# Patient Record
Sex: FEMALE | Race: BLACK OR AFRICAN AMERICAN | Employment: OTHER | URBAN - METROPOLITAN AREA
[De-identification: names, ages, dates, MRNs, and addresses within clinical notes are randomized per-mention and may not be internally consistent; named-entity substitution may affect disease eponyms.]

---

## 2018-10-25 ENCOUNTER — APPOINTMENT (OUTPATIENT)
Dept: CT IMAGING | Facility: HOSPITAL | Age: 73
DRG: 244 | End: 2018-10-25
Attending: EMERGENCY MEDICINE
Payer: MEDICARE

## 2018-10-25 ENCOUNTER — HOSPITAL ENCOUNTER (INPATIENT)
Facility: HOSPITAL | Age: 73
LOS: 5 days | Discharge: HOME | DRG: 244 | End: 2018-10-31
Attending: EMERGENCY MEDICINE | Admitting: HOSPITALIST
Payer: MEDICARE

## 2018-10-25 ENCOUNTER — APPOINTMENT (OUTPATIENT)
Dept: GENERAL RADIOLOGY | Facility: HOSPITAL | Age: 73
DRG: 244 | End: 2018-10-25
Attending: EMERGENCY MEDICINE
Payer: MEDICARE

## 2018-10-25 DIAGNOSIS — R55 SYNCOPE AND COLLAPSE: Primary | ICD-10-CM

## 2018-10-25 DIAGNOSIS — R77.8 ELEVATED TROPONIN: ICD-10-CM

## 2018-10-25 PROCEDURE — 71046 X-RAY EXAM CHEST 2 VIEWS: CPT | Performed by: EMERGENCY MEDICINE

## 2018-10-25 PROCEDURE — 99223 1ST HOSP IP/OBS HIGH 75: CPT | Performed by: HOSPITALIST

## 2018-10-25 PROCEDURE — 71260 CT THORAX DX C+: CPT | Performed by: EMERGENCY MEDICINE

## 2018-10-25 RX ORDER — ASPIRIN 81 MG/1
324 TABLET, CHEWABLE ORAL ONCE
Status: COMPLETED | OUTPATIENT
Start: 2018-10-25 | End: 2018-10-25

## 2018-10-25 RX ORDER — SODIUM CHLORIDE 9 MG/ML
INJECTION, SOLUTION INTRAVENOUS ONCE
Status: COMPLETED | OUTPATIENT
Start: 2018-10-25 | End: 2018-10-25

## 2018-10-25 RX ORDER — SODIUM CHLORIDE 9 MG/ML
1000 INJECTION, SOLUTION INTRAVENOUS ONCE
Status: COMPLETED | OUTPATIENT
Start: 2018-10-25 | End: 2018-10-25

## 2018-10-26 ENCOUNTER — APPOINTMENT (OUTPATIENT)
Dept: CV DIAGNOSTICS | Facility: HOSPITAL | Age: 73
DRG: 244 | End: 2018-10-26
Attending: HOSPITALIST
Payer: MEDICARE

## 2018-10-26 PROBLEM — R77.8 ELEVATED TROPONIN: Status: ACTIVE | Noted: 2018-10-26

## 2018-10-26 PROCEDURE — 93306 TTE W/DOPPLER COMPLETE: CPT | Performed by: HOSPITALIST

## 2018-10-26 PROCEDURE — 99233 SBSQ HOSP IP/OBS HIGH 50: CPT | Performed by: HOSPITALIST

## 2018-10-26 RX ORDER — ATORVASTATIN CALCIUM 20 MG/1
20 TABLET, FILM COATED ORAL NIGHTLY
Status: DISCONTINUED | OUTPATIENT
Start: 2018-10-26 | End: 2018-10-26

## 2018-10-26 RX ORDER — HEPARIN SODIUM 5000 [USP'U]/ML
5000 INJECTION, SOLUTION INTRAVENOUS; SUBCUTANEOUS EVERY 12 HOURS
Status: DISCONTINUED | OUTPATIENT
Start: 2018-10-26 | End: 2018-10-30

## 2018-10-26 RX ORDER — ASPIRIN 81 MG/1
81 TABLET, CHEWABLE ORAL DAILY
Status: DISCONTINUED | OUTPATIENT
Start: 2018-10-26 | End: 2018-10-31

## 2018-10-26 RX ORDER — DEXTROSE MONOHYDRATE 25 G/50ML
50 INJECTION, SOLUTION INTRAVENOUS AS NEEDED
Status: DISCONTINUED | OUTPATIENT
Start: 2018-10-26 | End: 2018-10-31

## 2018-10-26 RX ORDER — ACETAMINOPHEN 325 MG/1
650 TABLET ORAL EVERY 6 HOURS PRN
Status: DISCONTINUED | OUTPATIENT
Start: 2018-10-26 | End: 2018-10-31

## 2018-10-26 RX ORDER — AMLODIPINE BESYLATE 5 MG/1
5 TABLET ORAL DAILY
Status: DISCONTINUED | OUTPATIENT
Start: 2018-10-26 | End: 2018-10-26

## 2018-10-26 RX ORDER — INSULIN ASPART 100 [IU]/ML
12 INJECTION, SOLUTION INTRAVENOUS; SUBCUTANEOUS
Status: DISCONTINUED | OUTPATIENT
Start: 2018-10-26 | End: 2018-10-31

## 2018-10-26 RX ORDER — INSULIN ASPART 100 [IU]/ML
12 INJECTION, SOLUTION INTRAVENOUS; SUBCUTANEOUS
Status: ON HOLD | COMMUNITY
End: 2018-10-31

## 2018-10-26 RX ORDER — ATORVASTATIN CALCIUM 40 MG/1
80 TABLET, FILM COATED ORAL NIGHTLY
Status: DISCONTINUED | OUTPATIENT
Start: 2018-10-26 | End: 2018-10-31

## 2018-10-26 RX ORDER — ONDANSETRON 2 MG/ML
4 INJECTION INTRAMUSCULAR; INTRAVENOUS EVERY 6 HOURS PRN
Status: DISCONTINUED | OUTPATIENT
Start: 2018-10-26 | End: 2018-10-30

## 2018-10-26 RX ORDER — SODIUM CHLORIDE AND POTASSIUM CHLORIDE .9; .15 G/100ML; G/100ML
SOLUTION INTRAVENOUS CONTINUOUS
Status: DISCONTINUED | OUTPATIENT
Start: 2018-10-26 | End: 2018-10-26

## 2018-10-26 RX ORDER — AMLODIPINE BESYLATE 10 MG/1
10 TABLET ORAL DAILY
Status: DISCONTINUED | OUTPATIENT
Start: 2018-10-27 | End: 2018-10-31

## 2018-10-26 RX ORDER — IRBESARTAN AND HYDROCHLOROTHIAZIDE 300; 12.5 MG/1; MG/1
1 TABLET, FILM COATED ORAL DAILY
Status: DISCONTINUED | OUTPATIENT
Start: 2018-10-26 | End: 2018-10-26 | Stop reason: RX

## 2018-10-26 RX ORDER — 0.9 % SODIUM CHLORIDE 0.9 %
VIAL (ML) INJECTION
Status: COMPLETED
Start: 2018-10-26 | End: 2018-10-26

## 2018-10-26 RX ORDER — ALPRAZOLAM 0.5 MG/1
0.5 TABLET ORAL 3 TIMES DAILY PRN
COMMUNITY

## 2018-10-26 RX ORDER — FELODIPINE 5 MG/1
5 TABLET, EXTENDED RELEASE ORAL DAILY
COMMUNITY

## 2018-10-26 RX ORDER — HYDROCODONE BITARTRATE AND ACETAMINOPHEN 5; 325 MG/1; MG/1
1 TABLET ORAL EVERY 8 HOURS PRN
COMMUNITY

## 2018-10-26 RX ORDER — POTASSIUM CHLORIDE 20 MEQ/1
40 TABLET, EXTENDED RELEASE ORAL EVERY 4 HOURS
Status: COMPLETED | OUTPATIENT
Start: 2018-10-26 | End: 2018-10-26

## 2018-10-26 RX ORDER — IRBESARTAN AND HYDROCHLOROTHIAZIDE 300; 12.5 MG/1; MG/1
1 TABLET, FILM COATED ORAL DAILY
COMMUNITY

## 2018-10-26 RX ORDER — SODIUM CHLORIDE 9 MG/ML
INJECTION, SOLUTION INTRAVENOUS CONTINUOUS
Status: DISCONTINUED | OUTPATIENT
Start: 2018-10-26 | End: 2018-10-26

## 2018-10-26 RX ORDER — SIMVASTATIN 40 MG
40 TABLET ORAL NIGHTLY
COMMUNITY

## 2018-10-26 RX ORDER — MAGNESIUM OXIDE 400 MG (241.3 MG MAGNESIUM) TABLET
400 TABLET ONCE
Status: COMPLETED | OUTPATIENT
Start: 2018-10-26 | End: 2018-10-26

## 2018-10-26 NOTE — CONSULTS
[facility]    Report of Consultation    Carlos Mondragon Patient Status:  Inpatient    1945 MRN [id number]   Location [facility] 3W/SW Attending Kasey Zee, [address] [facility] Day # 0 PCP Unknown Pcp     Date of Admission:  10/25 losartan (COZAAR) 100 mg, hydrochlorothiazide (MICROZIDE) 12.5 mg for Hyzaar 100/12.5 (H only)  Oral Daily       Medications Prior to Admission:  ALPRAZolam 0.5 MG Oral Tab Take 0.5 mg by mouth 3 (three) times daily as needed for Sleep or Anxiety.    in murmur, click, rub or gallop, regular rate and rhythm  Abdominal: soft, non-tender; bowel sounds normal; no masses,  no organomegaly  Extremities: extremities normal, atraumatic, no cyanosis or edema  Pulses: 2+ and symmetric    Results:     Laboratory Margarito intermittent mobitz 2 AV block:  - echo today  - DSE in AM or [facility] Monday , I ll discuss with Dr Basil Lo  - TSH and free T4  - avoid BB and verapamil or diltiazem  - might need PPM     2 Elevated troponin she had CPR for few seconds, flat curve,   - Echo

## 2018-10-26 NOTE — H&P
Fritz 30 Patient Status:  Emergency    1945 MRN [id number]   Location [address] [facility] Attending Jemma Davis MD   Hosp Day # 0 PCP Unknown Pcp     Date:  10/25 Negative. Integumentary:  Negative. Neurologic:  Negative. Psychiatric:  Negative.   ROS reviewed as documented in chart    Physical Exam:  Temp:  [99 °F (37.2 °C)] 99 °F (37.2 °C)  Pulse:  [110-114] 112  Resp:  [18-26] 22  BP: (133-156)/(66-75) 144/69 get 2D echo to further evaluate cardiac status, cardiology has been consulted. Elevated troponin  Unclear significance post \"CPR\", cardiology on board, consider stress test to further evaluate.     Sinus tachycardia  Check TSH, possibly early withdrawa

## 2018-10-26 NOTE — ED INITIAL ASSESSMENT (HPI)
dtr states that the patient is getting over bronchitis. Today patienbt had a coughing episode and had a syncopal episode during the cough. Family lowered patient to the ground. Family report patient was unresponsive for 1 min.  Family member began chest com

## 2018-10-26 NOTE — PLAN OF CARE
Problem: Patient/Family Goals  Goal: Patient/Family Long Term Goal  Patient's Long Term Goal:   - ability to exert without shortness of breath.     - stable diabetic parameters consistently    Interventions:  - watch specific dietary intake   - participate imbalances  - Administer electrolyte replacement as ordered  - Monitor response to electrolyte replacements, including rhythm and repeat lab results as appropriate  - Fluid restriction as ordered  - Instruct patient on fluid and nutrition restrictions as a Spiritual Care or Behavioral Health consult as needed  Outcome: Progressing      Problem: DECISION MAKING  Goal: Pt/Family able to effectively weigh alternatives and participate in decision making related to treatment and care  INTERVENTIONS:  - Determine

## 2018-10-26 NOTE — ED PROVIDER NOTES
Patient Seen in: [facility] AND [facility] Emergency Department    History   Patient presents with:  Syncope (cardiovascular, neurologic)    Stated Complaint: cough syncope    HPI    68-year-old female with history of diabetes, hypertension presents for evaluati SpO2 96%   BMI 30.73 kg/m²         Physical Exam   Constitutional: She is oriented to person, place, and time. She appears well-developed and well-nourished. No distress. HENT:   Head: Normocephalic and atraumatic.    Mouth/Throat: Oropharynx is clear and following components:    Troponin 0.04 (*)     All other components within normal limits   ETHYL ALCOHOL - Abnormal; Notable for the following components:    Ethyl Alcohol 157 (*)     All other components within normal limits   CBC W/ DIFFERENTIAL - Abnorm upper abdomen are unremarkable. PA and lateral chest x-ray at 2333    IMPRESSION:  Lungs are clear. Prominent cardiomediastinal silhouette.       Radiology exams  Viewed and reviewed by myself and findings discussed with patient including need for follo

## 2018-10-26 NOTE — PROGRESS NOTES
irbesartan-HCTZ (AVALIDE) 300-12.5 mg tab  is Non-Formulary Medication &  Auto-Substituted to losartan (COZAAR) 100 mg, hydrochlorothiazide (MICROZIDE) 12.5 mg for Hyzaar 100/12.5 (EEH only) Per P&T PROTOCOL

## 2018-10-26 NOTE — PROGRESS NOTES
[facility]    Progress Note    John Leonard Patient Status:  Inpatient    1945 MRN [id number]   Location [facility] 3W/SW Attending Ronnie Dorsey, [address] Day # 0 PCP Unknown Pcp       Subjective:   John Leonard is Component Value Date    WBC 4.5 10/26/2018    HGB 11.2 (L) 10/26/2018    HCT 35.3 10/26/2018     10/26/2018    CREATSERUM 0.69 10/26/2018    BUN 16 10/26/2018     10/26/2018    K 4.4 10/26/2018    K 4.4 10/26/2018     10/26/2018    CO2 Electronically signed on 10/26/2018 at 10:38 by Aura Clarke MD      Results:     CBC:    Lab Results   Component Value Date    WBC 4.5 10/26/2018    WBC 6.9 10/25/2018     Lab Results   Component Value Date    HGB 11.2 (L) 10/26/2018    HGB 12.1 10/25/2

## 2018-10-26 NOTE — PHYSICAL THERAPY NOTE
PHYSICAL THERAPY QUICK EVALUATION - INPATIENT    Room Number: 318/318-A  Evaluation Date: 10/26/2018  Presenting Problem: Syncope and collapse  Physician Order: PT Eval and Treat    Problem List  Principal Problem:    Syncope and collapse  Active Problem lying on back to sitting on the side of the bed?: None   How much help from another person does the patient currently need. ..   -   Moving to and from a bed to a chair (including a wheelchair)?: None   -   Need to walk in hospital room?: None   -   [place] Patient discharged from Physical Therapy services. Please re-order if a new functional limitation presents during this admission. GOALS  Patient was able to achieve the following goals . ..     Patient was able to transfer Safely and independently   Lorri Sandhoff

## 2018-10-26 NOTE — ED NOTES
Patient now is complaining of lightheadedness, shortness of breath and weakness. The patient denies chest pain or palpitations.

## 2018-10-27 PROCEDURE — 99232 SBSQ HOSP IP/OBS MODERATE 35: CPT | Performed by: HOSPITALIST

## 2018-10-27 RX ORDER — SODIUM CHLORIDE 9 MG/ML
INJECTION, SOLUTION INTRAVENOUS
Status: DISPENSED
Start: 2018-10-27 | End: 2018-10-27

## 2018-10-27 RX ORDER — MAGNESIUM OXIDE 400 MG (241.3 MG MAGNESIUM) TABLET
400 TABLET ONCE
Status: COMPLETED | OUTPATIENT
Start: 2018-10-27 | End: 2018-10-27

## 2018-10-27 RX ORDER — POTASSIUM CHLORIDE 20 MEQ/1
40 TABLET, EXTENDED RELEASE ORAL ONCE
Status: COMPLETED | OUTPATIENT
Start: 2018-10-27 | End: 2018-10-27

## 2018-10-27 NOTE — PROGRESS NOTES
[facility]    Progress Note    Enrique Saldana Patient Status:  Inpatient    1945 MRN [id number]   Location [facility] 3W/SW Attending Denzel Joiner, [address] [facility] [address] Day # 1 PCP Unknown Pcp       Subjective:   Enrique Saldana is Results   Component Value Date    WBC 4.6 10/27/2018    HGB 11.1 (L) 10/27/2018    HCT 35.1 10/27/2018     10/27/2018    CREATSERUM 0.62 10/27/2018    BUN 11 10/27/2018     10/27/2018    K 3.8 10/27/2018     10/27/2018    CO2 23 10/27/20 signed on 10/26/2018 at 10:38 by Nuzhat Singh MD      Results:     CBC:    Lab Results   Component Value Date    WBC 4.6 10/27/2018    WBC 4.5 10/26/2018    WBC 6.9 10/25/2018     Lab Results   Component Value Date    HGB 11.1 (L) 10/27/2018    HGB 11.2 documentation in H+P. Based on patients current state of illness, I anticipate that, after discharge, patient will require TBD.

## 2018-10-27 NOTE — PLAN OF CARE
ANXIETY    • Will report anxiety at manageable levels Progressing        CARDIOVASCULAR - ADULT    • Maintains optimal cardiac output and hemodynamic stability Progressing        COPING    • Pt/Family able to verbalize concerns and demonstrate effective co

## 2018-10-27 NOTE — PLAN OF CARE
Problem: Patient/Family Goals  Goal: Patient/Family Long Term Goal  Patient's Long Term Goal:   - ability to exert without shortness of breath.     - stable diabetic parameters consistently    Interventions:  - watch specific dietary intake   - participate imbalances  - Administer electrolyte replacement as ordered  - Monitor response to electrolyte replacements, including rhythm and repeat lab results as appropriate  - Fluid restriction as ordered  - Instruct patient on fluid and nutrition restrictions as a Spiritual Care or Behavioral Health consult as needed  Outcome: Progressing      Problem: DECISION MAKING  Goal: Pt/Family able to effectively weigh alternatives and participate in decision making related to treatment and care  INTERVENTIONS:  - Determine test was canceled last night unbeknownst to nursing staff. Patient is self care; up to the bathroom by herself. Patient states that she is feeling weak and tired; call from tele with ; she displaying increased HR yesterday, as well.  Currently await

## 2018-10-28 PROCEDURE — 99233 SBSQ HOSP IP/OBS HIGH 50: CPT | Performed by: HOSPITALIST

## 2018-10-28 RX ORDER — CHLORHEXIDINE GLUCONATE 4 G/100ML
30 SOLUTION TOPICAL
Status: COMPLETED | OUTPATIENT
Start: 2018-10-29 | End: 2018-10-29

## 2018-10-28 RX ORDER — SODIUM CHLORIDE 9 MG/ML
INJECTION, SOLUTION INTRAVENOUS CONTINUOUS
Status: DISCONTINUED | OUTPATIENT
Start: 2018-10-28 | End: 2018-10-28

## 2018-10-28 RX ORDER — ASPIRIN 81 MG/1
324 TABLET, CHEWABLE ORAL ONCE
Status: COMPLETED | OUTPATIENT
Start: 2018-10-28 | End: 2018-10-29

## 2018-10-28 RX ORDER — SODIUM CHLORIDE 9 MG/ML
INJECTION, SOLUTION INTRAVENOUS CONTINUOUS
Status: DISCONTINUED | OUTPATIENT
Start: 2018-10-29 | End: 2018-10-31

## 2018-10-28 NOTE — PLAN OF CARE
Tele notified rn that patient had 7 beats of AiVR,  Rate ,  rn checked on patient. Patient was just returning from bathroom and in bed. Pt was asymptomatic. Will continue to monitor.

## 2018-10-28 NOTE — PLAN OF CARE
Request from dr. Pelon Boswell to scan patients tele strips from 10/27 2100 of patients pauses. brynn kuo rn scanned strip in er, assisted by eve. Hard copy of tele strips placed back in chart.

## 2018-10-28 NOTE — PROGRESS NOTES
Patient seen in follow up. Patient denies any chest pain or sob. Extensive records were reviewed.    10/27/18  1729   BP: 154/71   Pulse:    Resp: 18   Temp: 98.5 °F (36.9 °C)       Intake/Output Summary (Last 24 hours) at 10/27/2018 1956  Last data Felodipine ER 5 MG Oral Tablet 24 Hr Take 5 mg by mouth daily. HYDROcodone-acetaminophen 5-325 MG Oral Tab Take 1 tablet by mouth every 8 (eight) hours as needed for Pain.    Irbesartan-Hydrochlorothiazide 300-12.5 MG Oral Tab Take 1 tablet by mouth daily EMS run sheet was reviewed. When EMS arrived patient was in sinus tachycardia on the EKG with a normal blood pressure. Time spent over 35 minutes  IMPRESSION:     Syncope, suspect related to intermittent AV block.   Patient was noted to have pauses up to

## 2018-10-28 NOTE — PROGRESS NOTES
[facility] - [facility]    Progress Note    Sonam Suarez Patient Status:  Inpatient    1945 MRN [id number]   Location [facility] 3W/SW Attending Jourdan Bran, [address] [facility] Day # 2 PCP Unknown Pcp       Subjective:   Sonam Suarez is Daily       Lab Results   Component Value Date    WBC 5.2 10/28/2018    HGB 11.3 (L) 10/28/2018    HCT 35.8 10/28/2018     10/28/2018    CREATSERUM 0.68 10/28/2018    BUN 12 10/28/2018     10/28/2018    K 4.2 10/28/2018    K 4.2 10/28/2018 discussing labs and imaging findings. Spoke with consultant. All questions answered.          10/28/2018

## 2018-10-28 NOTE — PROGRESS NOTES
Patient seen in follow up. Patient denies any chest pain or sob. Felt dizzy last night at 9:17 pm when she had her 3.52 sec pause.    10/28/18  1016   BP: 148/70   Pulse:    Resp: 18   Temp: 98.4 °F (36.9 °C)       Intake/Output Summary (Last 24 hour Felodipine ER 5 MG Oral Tablet 24 Hr Take 5 mg by mouth daily. HYDROcodone-acetaminophen 5-325 MG Oral Tab Take 1 tablet by mouth every 8 (eight) hours as needed for Pain.    Irbesartan-Hydrochlorothiazide 300-12.5 MG Oral Tab Take 1 tablet by mouth daily 3.52 second pause last night with episode of complete heart block. No call from RN to inform. Plan for cardiac cath tomorrow, then [facility] Tuesday.     Ghulam Hopson, DO [facility]  [address], [facility]  Phone: [phone]  [email or web address]

## 2018-10-28 NOTE — PLAN OF CARE
ANXIETY    • Will report anxiety at manageable levels Not Progressing        CARDIOVASCULAR - ADULT    • Maintains optimal cardiac output and hemodynamic stability Not Progressing        COPING    • Pt/Family able to verbalize concerns and demonstrate effe

## 2018-10-29 ENCOUNTER — APPOINTMENT (OUTPATIENT)
Dept: INTERVENTIONAL RADIOLOGY/VASCULAR | Facility: HOSPITAL | Age: 73
DRG: 244 | End: 2018-10-29
Attending: INTERNAL MEDICINE
Payer: MEDICARE

## 2018-10-29 PROCEDURE — B2111ZZ FLUOROSCOPY OF MULTIPLE CORONARY ARTERIES USING LOW OSMOLAR CONTRAST: ICD-10-PCS | Performed by: INTERNAL MEDICINE

## 2018-10-29 PROCEDURE — 4A023N7 MEASUREMENT OF CARDIAC SAMPLING AND PRESSURE, LEFT HEART, PERCUTANEOUS APPROACH: ICD-10-PCS | Performed by: INTERNAL MEDICINE

## 2018-10-29 PROCEDURE — 99232 SBSQ HOSP IP/OBS MODERATE 35: CPT | Performed by: HOSPITALIST

## 2018-10-29 RX ORDER — SODIUM CHLORIDE 9 MG/ML
INJECTION, SOLUTION INTRAVENOUS CONTINUOUS
Status: DISCONTINUED | OUTPATIENT
Start: 2018-10-29 | End: 2018-10-29

## 2018-10-29 RX ORDER — VERAPAMIL HYDROCHLORIDE 2.5 MG/ML
INJECTION, SOLUTION INTRAVENOUS
Status: COMPLETED
Start: 2018-10-29 | End: 2018-10-29

## 2018-10-29 RX ORDER — CHLORHEXIDINE GLUCONATE 4 G/100ML
30 SOLUTION TOPICAL
Status: COMPLETED | OUTPATIENT
Start: 2018-10-30 | End: 2018-10-30

## 2018-10-29 RX ORDER — HYDROCHLOROTHIAZIDE 12.5 MG/1
12.5 CAPSULE, GELATIN COATED ORAL ONCE
Status: COMPLETED | OUTPATIENT
Start: 2018-10-29 | End: 2018-10-29

## 2018-10-29 RX ORDER — ASPIRIN 81 MG/1
324 TABLET, CHEWABLE ORAL ONCE
Status: COMPLETED | OUTPATIENT
Start: 2018-10-29 | End: 2018-10-30

## 2018-10-29 RX ORDER — SODIUM CHLORIDE 9 MG/ML
INJECTION, SOLUTION INTRAVENOUS
Status: COMPLETED
Start: 2018-10-29 | End: 2018-10-29

## 2018-10-29 RX ORDER — MIDAZOLAM HYDROCHLORIDE 1 MG/ML
INJECTION INTRAMUSCULAR; INTRAVENOUS
Status: COMPLETED
Start: 2018-10-29 | End: 2018-10-29

## 2018-10-29 RX ORDER — CEFAZOLIN SODIUM/WATER 2 G/20 ML
2 SYRINGE (ML) INTRAVENOUS
Status: COMPLETED | OUTPATIENT
Start: 2018-10-30 | End: 2018-10-30

## 2018-10-29 RX ORDER — SODIUM CHLORIDE 9 MG/ML
INJECTION, SOLUTION INTRAVENOUS CONTINUOUS
Status: DISCONTINUED | OUTPATIENT
Start: 2018-10-29 | End: 2018-10-31

## 2018-10-29 RX ORDER — SODIUM CHLORIDE 9 MG/ML
INJECTION, SOLUTION INTRAVENOUS CONTINUOUS
Status: ACTIVE | OUTPATIENT
Start: 2018-10-29 | End: 2018-10-29

## 2018-10-29 RX ORDER — POTASSIUM CHLORIDE 20 MEQ/1
40 TABLET, EXTENDED RELEASE ORAL ONCE
Status: COMPLETED | OUTPATIENT
Start: 2018-10-29 | End: 2018-10-29

## 2018-10-29 RX ORDER — LOSARTAN POTASSIUM 100 MG/1
100 TABLET ORAL DAILY
Status: DISCONTINUED | OUTPATIENT
Start: 2018-10-30 | End: 2018-10-31

## 2018-10-29 RX ORDER — LIDOCAINE HYDROCHLORIDE 20 MG/ML
INJECTION, SOLUTION EPIDURAL; INFILTRATION; INTRACAUDAL; PERINEURAL
Status: COMPLETED
Start: 2018-10-29 | End: 2018-10-29

## 2018-10-29 RX ORDER — NITROGLYCERIN 20 MG/100ML
INJECTION INTRAVENOUS
Status: COMPLETED
Start: 2018-10-29 | End: 2018-10-29

## 2018-10-29 RX ORDER — HYDROCHLOROTHIAZIDE 25 MG/1
25 TABLET ORAL DAILY
Status: DISCONTINUED | OUTPATIENT
Start: 2018-10-30 | End: 2018-10-31

## 2018-10-29 RX ORDER — HEPARIN SODIUM 1000 [USP'U]/ML
INJECTION, SOLUTION INTRAVENOUS; SUBCUTANEOUS
Status: COMPLETED
Start: 2018-10-29 | End: 2018-10-29

## 2018-10-29 NOTE — PROGRESS NOTES
Gilford Singh  [id number]  10/29/2018    Post procedure/ recovery hand-off report given to [facility] CURTIS ELIZONDO RN. Patient's vital signs are stable. Procedural access site is dry and intact with  no signs and symptoms of bleeding/ hematoma. Bedrest maintained.  Dr Carolyn Sutton

## 2018-10-29 NOTE — PROGRESS NOTES
Patient seen in follow up. Patient denies any chest pain or sob.        10/29/18  1115   BP: (P) 127/67   Pulse:    Resp:    Temp:        Intake/Output Summary (Last 24 hours) at 10/29/2018 1232  Last data filed at 10/29/2018 1113  Gross per 24 hour Felodipine ER 5 MG Oral Tablet 24 Hr Take 5 mg by mouth daily. HYDROcodone-acetaminophen 5-325 MG Oral Tab Take 1 tablet by mouth every 8 (eight) hours as needed for Pain.    Irbesartan-Hydrochlorothiazide 300-12.5 MG Oral Tab Take 1 tablet by mouth daily 3.52 second pause 10/28/18 with episode of complete heart block. cardiac cath no obstructions, PPM tomorow      Thank you for allowing me to participate in the care of your patient. please call if you have any question.      Kelin Guthrie MD   General Cardiol

## 2018-10-29 NOTE — PROGRESS NOTES
[facility]    Progress Note    Tenzin Freeman Patient Status:  Inpatient    1945 MRN [id number]   Location [facility] 3W/SW Attending Victor M Torres, [address] [facility] Day # 3 PCP Unknown Pcp       Subjective:   Tenzin Freeman is Component Value Date    WBC 5.8 10/29/2018    HGB 11.7 (L) 10/29/2018    HCT 37.2 10/29/2018     10/29/2018    CREATSERUM 0.58 10/29/2018    BUN 13 10/29/2018     10/29/2018    K 3.8 10/29/2018     10/29/2018    CO2 24 10/29/2018    GL encouraged to quit.     Prophylaxis  Subcutaneous heparin     CODE STATUS  Full     Primary care physician  Unknown Pcp     Disposition  Clinical course will dictate outcome                Nicholas Sandhoff, DO  >35 min spent with patient.  > 50% time was spe

## 2018-10-29 NOTE — OPERATIVE REPORT
Preop diagnosis: Cardiac arrest with mildly elevated troponin  Date of procedure 10/29/2018  Post op diagnosis: Minimal coronary calcification without obstructive disease  Procedures: Left heart catheterization, bilateral coronary angiogram, conscious zofia

## 2018-10-29 NOTE — PLAN OF CARE
Problem: Patient/Family Goals  Goal: Patient/Family Long Term Goal  Patient's Long Term Goal:   - ability to exert without shortness of breath.     - stable diabetic parameters consistently    Interventions:  - watch specific dietary intake   - participate imbalances  - Administer electrolyte replacement as ordered  - Monitor response to electrolyte replacements, including rhythm and repeat lab results as appropriate  - Fluid restriction as ordered  - Instruct patient on fluid and nutrition restrictions as a Spiritual Care or Behavioral Health consult as needed  Outcome: Progressing      Problem: DECISION MAKING  Goal: Pt/Family able to effectively weigh alternatives and participate in decision making related to treatment and care  INTERVENTIONS:  - Determine present and involved in her care since admission. Patient care plan is to have a pacemaker placed tomorrow 10/30/2018. Patient resting comfortably in bed in lowest setting with call light within reach.

## 2018-10-30 ENCOUNTER — APPOINTMENT (OUTPATIENT)
Dept: GENERAL RADIOLOGY | Facility: HOSPITAL | Age: 73
DRG: 244 | End: 2018-10-30
Attending: INTERNAL MEDICINE
Payer: MEDICARE

## 2018-10-30 ENCOUNTER — APPOINTMENT (OUTPATIENT)
Dept: INTERVENTIONAL RADIOLOGY/VASCULAR | Facility: HOSPITAL | Age: 73
DRG: 244 | End: 2018-10-30
Attending: INTERNAL MEDICINE
Payer: MEDICARE

## 2018-10-30 PROCEDURE — 71045 X-RAY EXAM CHEST 1 VIEW: CPT | Performed by: INTERNAL MEDICINE

## 2018-10-30 PROCEDURE — 02H63JZ INSERTION OF PACEMAKER LEAD INTO RIGHT ATRIUM, PERCUTANEOUS APPROACH: ICD-10-PCS | Performed by: INTERNAL MEDICINE

## 2018-10-30 PROCEDURE — 99233 SBSQ HOSP IP/OBS HIGH 50: CPT | Performed by: HOSPITALIST

## 2018-10-30 PROCEDURE — 0JH606Z INSERTION OF PACEMAKER, DUAL CHAMBER INTO CHEST SUBCUTANEOUS TISSUE AND FASCIA, OPEN APPROACH: ICD-10-PCS | Performed by: INTERNAL MEDICINE

## 2018-10-30 PROCEDURE — 02HK3JZ INSERTION OF PACEMAKER LEAD INTO RIGHT VENTRICLE, PERCUTANEOUS APPROACH: ICD-10-PCS | Performed by: INTERNAL MEDICINE

## 2018-10-30 RX ORDER — SODIUM CHLORIDE 9 MG/ML
INJECTION, SOLUTION INTRAVENOUS
Status: COMPLETED
Start: 2018-10-30 | End: 2018-10-30

## 2018-10-30 RX ORDER — CEFAZOLIN SODIUM/WATER 2 G/20 ML
2 SYRINGE (ML) INTRAVENOUS EVERY 8 HOURS
Status: COMPLETED | OUTPATIENT
Start: 2018-10-30 | End: 2018-10-31

## 2018-10-30 RX ORDER — ACETAMINOPHEN AND CODEINE PHOSPHATE 300; 30 MG/1; MG/1
2 TABLET ORAL EVERY 4 HOURS PRN
Status: DISCONTINUED | OUTPATIENT
Start: 2018-10-30 | End: 2018-10-31

## 2018-10-30 RX ORDER — CEFAZOLIN SODIUM/WATER 2 G/20 ML
SYRINGE (ML) INTRAVENOUS
Status: COMPLETED
Start: 2018-10-30 | End: 2018-10-30

## 2018-10-30 RX ORDER — LIDOCAINE HYDROCHLORIDE AND EPINEPHRINE 10; 10 MG/ML; UG/ML
INJECTION, SOLUTION INFILTRATION; PERINEURAL
Status: COMPLETED
Start: 2018-10-30 | End: 2018-10-30

## 2018-10-30 RX ORDER — BACITRACIN 50000 [USP'U]/1
INJECTION, POWDER, LYOPHILIZED, FOR SOLUTION INTRAMUSCULAR
Status: COMPLETED
Start: 2018-10-30 | End: 2018-10-30

## 2018-10-30 RX ORDER — ACETAMINOPHEN 325 MG/1
650 TABLET ORAL EVERY 4 HOURS PRN
Status: DISCONTINUED | OUTPATIENT
Start: 2018-10-30 | End: 2018-10-31

## 2018-10-30 RX ORDER — MIDAZOLAM HYDROCHLORIDE 1 MG/ML
INJECTION INTRAMUSCULAR; INTRAVENOUS
Status: COMPLETED
Start: 2018-10-30 | End: 2018-10-30

## 2018-10-30 RX ORDER — ACETAMINOPHEN AND CODEINE PHOSPHATE 300; 30 MG/1; MG/1
1 TABLET ORAL EVERY 4 HOURS PRN
Status: DISCONTINUED | OUTPATIENT
Start: 2018-10-30 | End: 2018-10-31

## 2018-10-30 RX ORDER — ONDANSETRON 2 MG/ML
4 INJECTION INTRAMUSCULAR; INTRAVENOUS EVERY 6 HOURS PRN
Status: DISCONTINUED | OUTPATIENT
Start: 2018-10-30 | End: 2018-10-31

## 2018-10-30 NOTE — OPERATIVE REPORT
Preop diagnosis: mobitz 2 block  Post op diagnosis: same  Procedures: ddd ppm  Findings: as above.  Tortuous L subclavian vein  EBL: 20 mls  Specimens: None

## 2018-10-30 NOTE — PROGRESS NOTES
Patient seen in follow up. Patient denies any chest pain or sob.        10/30/18  0808   BP: 127/65   Pulse:    Resp: 18   Temp: 98.6 °F (37 °C)       Intake/Output Summary (Last 24 hours) at 10/30/2018 1151  Last data filed at 10/30/2018 0601  Gross ALPRAZolam 0.5 MG Oral Tab Take 0.5 mg by mouth 3 (three) times daily as needed for Sleep or Anxiety. insulin glargine 100 UNIT/ML Subcutaneous Solution Inject 46 Units into the skin nightly.    Felodipine ER 5 MG Oral Tablet 24 Hr Take 5 mg by mouth sterling Patient has high risk for underlying coronary artery disease. She is a long-term smoker with multiple family members who also have heart disease.   We will proceed with a left heart cath possible intervention for any coronary ischemia as etiology of heart

## 2018-10-30 NOTE — PROGRESS NOTES
Jonh Leonard  [id number]  10/30/2018    Post procedure/ recovery hand-off report given to Tamera Brush RN. Patient's vital signs are stable,. Procedural access site is  dry and intact with no signs and symptoms of bleeding/ hematoma.  Dr Mya Zimmer spoke with opal

## 2018-10-31 VITALS
DIASTOLIC BLOOD PRESSURE: 64 MMHG | WEIGHT: 182 LBS | RESPIRATION RATE: 18 BRPM | TEMPERATURE: 99 F | BODY MASS INDEX: 31.07 KG/M2 | OXYGEN SATURATION: 95 % | HEART RATE: 100 BPM | SYSTOLIC BLOOD PRESSURE: 115 MMHG | HEIGHT: 64 IN

## 2018-10-31 PROCEDURE — 99239 HOSP IP/OBS DSCHRG MGMT >30: CPT | Performed by: HOSPITALIST

## 2018-10-31 RX ORDER — METOPROLOL SUCCINATE 50 MG/1
50 TABLET, EXTENDED RELEASE ORAL
Qty: 30 TABLET | Refills: 0 | Status: SHIPPED | OUTPATIENT
Start: 2018-11-01

## 2018-10-31 RX ORDER — HYDROCHLOROTHIAZIDE 12.5 MG/1
12.5 CAPSULE, GELATIN COATED ORAL DAILY
Status: DISCONTINUED | OUTPATIENT
Start: 2018-10-31 | End: 2018-10-31

## 2018-10-31 RX ORDER — INSULIN ASPART 100 [IU]/ML
12 INJECTION, SOLUTION INTRAVENOUS; SUBCUTANEOUS
Qty: 1 VIAL | Refills: 0 | Status: SHIPPED | OUTPATIENT
Start: 2018-10-31

## 2018-10-31 RX ORDER — METOPROLOL SUCCINATE 50 MG/1
50 TABLET, EXTENDED RELEASE ORAL
Status: DISCONTINUED | OUTPATIENT
Start: 2018-10-31 | End: 2018-10-31

## 2018-10-31 RX ORDER — ASPIRIN 81 MG/1
81 TABLET, CHEWABLE ORAL DAILY
Qty: 30 TABLET | Refills: 0 | Status: SHIPPED | OUTPATIENT
Start: 2018-11-01

## 2018-10-31 NOTE — PROGRESS NOTES
Patient seen in follow up.  Post ppm.   10/31/18  0907   BP: 135/70   Pulse: 96   Resp: 18   Temp: 98.7 °F (37.1 °C)       Intake/Output Summary (Last 24 hours) at 10/31/2018 1008  Last data filed at 10/31/2018 0509  Gross per 24 hour   Intake 490 ml aspirin chewable tab 81 mg 81 mg Oral Daily       Medications Prior to Admission:  ALPRAZolam 0.5 MG Oral Tab Take 0.5 mg by mouth 3 (three) times daily as needed for Sleep or Anxiety.    insulin glargine 100 UNIT/ML Subcutaneous Solution Inject 46 Units in Hypertrophic cardiomyopathy. Mild LVOT gradient. Syncope does not appear to be related to ventricular arrhythmias. When EMS arrived patient was in sinus tachycardia. No previous episodes of near syncope.  Furthermore pauses noted at hospital.     PLAN:

## 2018-10-31 NOTE — PROGRESS NOTES
[facility]    Progress Note    Mariza Rodriguez Patient Status:  Inpatient    1945 MRN [id number]   Location [facility] 3W/SW Attending Indio Alanis, [address] [facility] [address] Day # 4 PCP Unknown Pcp       Subjective:   Mariza Rodrgiuez is liquid 15 g 15 g Oral Q15 Min PRN   Insulin Aspart Pen (NOVOLOG) 100 UNIT/ML flexpen 1-7 Units 1-7 Units Subcutaneous TID CC and HS   acetaminophen (TYLENOL) tab 650 mg 650 mg Oral Q6H PRN   AmLODIPine Besylate (NORVASC) tab 10 mg 10 mg Oral Daily   atorva GFRAA  >60  >60  >60   GFRNAA  >60  >60  >60   CA  9.1  9.3  9.4   NA  136  136  135*   K  4.2  4.2  3.8  3.9  3.9   CL  103  102  102   CO2  27  24  26       Cath:   Findings: Left main is normal except for calcification at the ostium.   Left anterior de

## 2018-10-31 NOTE — PROCEDURES
[facility]    PATIENT'S NAME: Tsering Claudio   ATTENDING PHYSICIAN: Shoshana Chavez DO   OPERATING PHYSICIAN: Ghulam Rubi DO   PATIENT ACCOUNT#:   [de-identified]    LOCATION:  [address] [place] [address] #:   [id number]       DATE OF BIRTH:  [birth year] ventricular lead to the right ventricular apex and achieve adequate pacing and sensing thresholds. The peel-away sheath was then removed. Ten V was without diaphragmatic stimulation.   With the other wire, we then similarly took a 9.5-Kinyarwanda sheath and th

## 2018-10-31 NOTE — PLAN OF CARE
Patient discharged on 10/31 at 1:00 pm. Patient alert and oriented. Belongings gathered and sent with patient. Discharge instructions given. Prescriptions given to patient. Tele monitor and IV removed. Patient discharged home.

## 2018-10-31 NOTE — DISCHARGE SUMMARY
Dc summary #  > 30 min spent on [address] Discharge Diagnoses: syncope    Lace+ Score: 38  59-90 High Risk  29-58 Medium Risk  0-28   Low Risk. TCM Follow-Up Recommendation:  LACE 29-58:  Moderate Risk of readmission after discharge from the hospital.

## 2018-11-02 NOTE — DISCHARGE SUMMARY
[facility]    PATIENT'S NAME: FIDENCIO June   ATTENDING PHYSICIAN: Staci David MD   PATIENT ACCOUNT#:   [id number]    LOCATION:  [address] [place] [address] Road #:   [id number]       YOB: 1945  ADMISSION DATE:       10/25/ Mobitz II block. The patient received pacemaker. CONDITION UPON DISCHARGE:  Stable. CODE STATUS:  Full Code. DIET:  Low fat, low salt, low cholesterol, 1800-calorie ADA. ACTIVITIES:  No heavy exercise.   No driving until cleared by her primary

## (undated) NOTE — LETTER
[address], [place]  Authorization for Invasive Procedures  1.  I hereby authorize Dr. Raina Benitez , my physician and whomever may be designated as the doctor's assistant, to perform the following operation and/or procedure:  Jayden performed for the purposes of advancing medicine, science, and/or education, provided my identity is not revealed. If the procedure has been videotaped, the physician/surgeon will obtain the original videotape.  The hospital will not be responsible for stor My signature below affirms that prior to the time of the procedure, I have explained to the patient and/or her legal representative, the risks and benefits involved in the proposed treatment and any reasonable alternative to the proposed treatment.  I have

## (undated) NOTE — LETTER
[facility], [place]  Authorization for Invasive Procedures  1.  I hereby authorize Dr. Kaity Gerber , my physician and whomever may be designated as the doctor's assistant, to perform the following operation and/or procedure:  Dual Ch performed for the purposes of advancing medicine, science, and/or education, provided my identity is not revealed. If the procedure has been videotaped, the physician/surgeon will obtain the original videotape.  The hospital will not be responsible for stor My signature below affirms that prior to the time of the procedure, I have explained to the patient and/or her legal representative, the risks and benefits involved in the proposed treatment and any reasonable alternative to the proposed treatment.  I have